# Patient Record
Sex: MALE | Race: OTHER | HISPANIC OR LATINO | URBAN - METROPOLITAN AREA
[De-identification: names, ages, dates, MRNs, and addresses within clinical notes are randomized per-mention and may not be internally consistent; named-entity substitution may affect disease eponyms.]

---

## 2019-08-18 ENCOUNTER — EMERGENCY (EMERGENCY)
Facility: HOSPITAL | Age: 44
LOS: 1 days | Discharge: HOME | End: 2019-08-18
Attending: EMERGENCY MEDICINE | Admitting: EMERGENCY MEDICINE
Payer: COMMERCIAL

## 2019-08-18 VITALS
DIASTOLIC BLOOD PRESSURE: 99 MMHG | OXYGEN SATURATION: 100 % | RESPIRATION RATE: 16 BRPM | HEART RATE: 71 BPM | SYSTOLIC BLOOD PRESSURE: 161 MMHG | TEMPERATURE: 98 F

## 2019-08-18 VITALS
RESPIRATION RATE: 16 BRPM | OXYGEN SATURATION: 98 % | HEART RATE: 69 BPM | DIASTOLIC BLOOD PRESSURE: 99 MMHG | SYSTOLIC BLOOD PRESSURE: 156 MMHG

## 2019-08-18 LAB
ALBUMIN SERPL ELPH-MCNC: 4.8 G/DL — SIGNIFICANT CHANGE UP (ref 3.3–5)
ALP SERPL-CCNC: 84 U/L — SIGNIFICANT CHANGE UP (ref 40–120)
ALT FLD-CCNC: 34 U/L — SIGNIFICANT CHANGE UP (ref 4–41)
ANION GAP SERPL CALC-SCNC: 13 MMO/L — SIGNIFICANT CHANGE UP (ref 7–14)
AST SERPL-CCNC: 26 U/L — SIGNIFICANT CHANGE UP (ref 4–40)
BILIRUB SERPL-MCNC: 0.4 MG/DL — SIGNIFICANT CHANGE UP (ref 0.2–1.2)
BUN SERPL-MCNC: 18 MG/DL — SIGNIFICANT CHANGE UP (ref 7–23)
CALCIUM SERPL-MCNC: 9.3 MG/DL — SIGNIFICANT CHANGE UP (ref 8.4–10.5)
CHLORIDE SERPL-SCNC: 101 MMOL/L — SIGNIFICANT CHANGE UP (ref 98–107)
CO2 SERPL-SCNC: 24 MMOL/L — SIGNIFICANT CHANGE UP (ref 22–31)
CREAT SERPL-MCNC: 1.08 MG/DL — SIGNIFICANT CHANGE UP (ref 0.5–1.3)
GLUCOSE SERPL-MCNC: 111 MG/DL — HIGH (ref 70–99)
HCT VFR BLD CALC: 42.7 % — SIGNIFICANT CHANGE UP (ref 39–50)
HGB BLD-MCNC: 12.9 G/DL — LOW (ref 13–17)
HIV COMBO RESULT: SIGNIFICANT CHANGE UP
HIV1+2 AB SPEC QL: SIGNIFICANT CHANGE UP
MCHC RBC-ENTMCNC: 26 PG — LOW (ref 27–34)
MCHC RBC-ENTMCNC: 30.2 % — LOW (ref 32–36)
MCV RBC AUTO: 85.9 FL — SIGNIFICANT CHANGE UP (ref 80–100)
NRBC # FLD: 0 K/UL — SIGNIFICANT CHANGE UP (ref 0–0)
PLATELET # BLD AUTO: 281 K/UL — SIGNIFICANT CHANGE UP (ref 150–400)
PMV BLD: 11.8 FL — SIGNIFICANT CHANGE UP (ref 7–13)
POTASSIUM SERPL-MCNC: 4.3 MMOL/L — SIGNIFICANT CHANGE UP (ref 3.5–5.3)
POTASSIUM SERPL-SCNC: 4.3 MMOL/L — SIGNIFICANT CHANGE UP (ref 3.5–5.3)
PROT SERPL-MCNC: 7.9 G/DL — SIGNIFICANT CHANGE UP (ref 6–8.3)
RBC # BLD: 4.97 M/UL — SIGNIFICANT CHANGE UP (ref 4.2–5.8)
RBC # FLD: 16.9 % — HIGH (ref 10.3–14.5)
SODIUM SERPL-SCNC: 138 MMOL/L — SIGNIFICANT CHANGE UP (ref 135–145)
TROPONIN T, HIGH SENSITIVITY: < 6 NG/L — SIGNIFICANT CHANGE UP (ref ?–14)
WBC # BLD: 6.03 K/UL — SIGNIFICANT CHANGE UP (ref 3.8–10.5)
WBC # FLD AUTO: 6.03 K/UL — SIGNIFICANT CHANGE UP (ref 3.8–10.5)

## 2019-08-18 PROCEDURE — 93010 ELECTROCARDIOGRAM REPORT: CPT

## 2019-08-18 PROCEDURE — 71046 X-RAY EXAM CHEST 2 VIEWS: CPT | Mod: 26

## 2019-08-18 PROCEDURE — 99284 EMERGENCY DEPT VISIT MOD MDM: CPT | Mod: 25

## 2019-08-18 RX ORDER — ASPIRIN/CALCIUM CARB/MAGNESIUM 324 MG
162 TABLET ORAL ONCE
Refills: 0 | Status: COMPLETED | OUTPATIENT
Start: 2019-08-18 | End: 2019-08-18

## 2019-08-18 RX ADMIN — Medication 162 MILLIGRAM(S): at 02:36

## 2019-08-18 NOTE — ED PROVIDER NOTE - CLINICAL SUMMARY MEDICAL DECISION MAKING FREE TEXT BOX
43yo healthy m here for 2 days intermittent chest tightness worse w exertion and mild SOB. Appears well NAD. Clear lungs, no peripheral edema or signs DVT. Possible ACS given hx though minimal risk factors. Will check labs incl trop, cxr, r/a

## 2019-08-18 NOTE — ED ADULT NURSE NOTE - OBJECTIVE STATEMENT
Break coverage- Pt received to spot #3. AAOx4, c/o right sided chest pain/tightness x2 weeks that radiates to midsternal area. Also c/o dizziness acc with the chest pain. NSR on cardiac monitor. Denies SOB. MD le performed. #20g IVSL placed to right ac, labs drawn and sent. Pt medicated with aspirin as per MD order. no acute distress. Awaiting further plan of care.

## 2019-08-18 NOTE — ED PROVIDER NOTE - OBJECTIVE STATEMENT
44m no pmh here c/o chest tightness x2 days. Pt describes intermittent dizziness he describes as lightheadedness for 2 wks; denies room-spinning. For past 2 days has felt mid-sternal tightness (denies pain) w/o radiation that is worse w exertion and improved at rest. Also w associated occasional SOB. No fever or cough. No recent travel, no leg pain/swelling, no hx VTE. Lifetime non-smoker. No family hx CAD. 44m no pmh here c/o chest tightness x1 days. Pt describes intermittent dizziness he describes as lightheadedness for 2 wks; denies room-spinning. For past 2 days has felt mid-sternal tightness (denies pain) that is worse w exertion and improved at rest. Also w associated occasional SOB. No fever or cough. No recent travel, no leg pain/swelling, no hx VTE. Lifetime non-smoker. No family hx CAD.

## 2019-08-18 NOTE — ED PROVIDER NOTE - NSFOLLOWUPINSTRUCTIONS_ED_ALL_ED_FT
-- Please follow up with a cardiologist ASA.  Please call for an appointment as soon as possible.  Explain why you were in the Emergency Department and that you need a follow up visit for continued outpatient care as soon as possible.  We will give you a list of these doctors but make sure your health insurance covers the doctor before making an appointment.   -- Please follow up with your doctor(s) within the next 3 days, but seek medical care sooner if your symptoms worsen. Call for an appointment as soon as possible.  -- You were given results from any tests performed in the Emergency Department which have results available. Show these to your doctor(s). Some of the tests we sent may not have results yet so please call or have your doctor call the Emergency Department to follow up on all results.  -- If you have worsening or concerning symptoms, see your doctor right away or return to the Emergency Department immediately.  -- Please continue taking your home medications as directed. Do not take extra doses of medication to make up for missed doses. Don't use alcohol when taking any medication (especially antibiotics, Tylenol or pain medication) unless you check with a doctor/pharmacist.

## 2019-08-18 NOTE — ED PROVIDER NOTE - PROGRESS NOTE DETAILS
Zvi Dubin, MD note: we spoke to patient after labs resulted.  We recommended admission given his worrisome HPI despite reassuring EKG & labs.  We discussed the R/B/B/A to admission in laymen's terms & the pt understood the conversation.  He elects to go home.  He was encouraged to seek rapid outpt f/u w/ cardiology. Zvi Dubin, MD note: Pt seen & reassessed.  Pt symptomatically improved.   A&Ox3, NAD, VSS, pt tolerated PO & ambulated w/steady unassisted gait in the ED.  We discussed the results of ED w/u w/patient (incl. presumptive Emergency Department dx, associated anticipatory guidance, stressing importance of prompt f/u, return precautions), & gave them a copy of results.  Patient verbalized understanding of ED course & agreed with our f/u recommendations, has decisional making capacity.  Pt st they will f/u w/PMD within the next 3 days; pt agrees to call today or tomorrow for an appointment. Pt agrees to return to the ED if there is any worsening or concerning symptoms. Zvi Dubin, MD note: we spoke to patient after labs resulted.  We recommended admission given his worrisome HPI despite reassuring EKG & labs.    The pt is clinically sober, A&Ox3, free from distracting injury.  Throughout our interactions in the Emergency Department today, the pt has demonstrated concrete thinking/reasoning, has maintained an orderly & reasonable conversation, appears to have intact insight/judgment/reason, a sound mind & therefore in our opinion has capacity now to make decisions.  Given the pt’s presentation, we explained, in laymen's terms, our concern for ACS & our recommendation for admission. The pt verbalized an understanding of our worries.  We’ve communicated to the pt that the ED evaluation is incomplete & many troublesome conditions haven’t been r/o.  We have discussed the need for further ED w/u so we can get more information about the pt’s condition.  We have reviewed the range of possible dx, potential R/B/A for testing & tx options.  Our discussions included the potential outcomes of leaving AMA, including worsening of their condition, becoming permanently disabled/in pain/critically ill, & death.  Despite these efforts, we were unable to persuade the pt to stay.  The pt is refusing any further ED care & is leaving AMA. We have attempted to offer tx/rx/guidance for any dangerous conditions which are most likely and/or dangerous.  We have answered all questions & have implored the pt to return to the ED ASAP to complete the w/u.   He was encouraged to seek rapid outpt f/u w/ cardiology.

## 2019-08-18 NOTE — ED PROVIDER NOTE - ATTENDING CONTRIBUTION TO CARE
44M no hx now p/w atraumatic, nonreproducible intermittent R pectoral tightness rad to midsternal CP today.  Sx worse w/exertion.  Also st feels light headed but not vertigo-like or presyncopal.  No n/v, diaphoresis, SOB, presyncope, LOC.    VS: afebrile, others   Gen: Well appearing in NAD  Head: NC/AT  HEENT:   Neck: trachea midline  Resp:  No distress  Ext: no deformities  Neuro:  A&O  Skin:  Warm and dry as visualized  Psych:  Normal affect and mood    EKG: NSR @69. No ischemic S-T/Tw changes.     MDM: CP most c/w ACS  based on hx, CAD risk factors, ED EKG reassuring  The CXR assists in r/o PNA, Ptx & esophageal tears.  The pt doesn't appear to have a PE based on the Wells Score & there is no apparent DVT.  There are no signs of pericarditis, endocarditis, or myocarditis based on hx, risk factor analysis & the ED EKG.  There is no fever.  There also doesn't appear to be an aortic dissection based on hx, exam, and signs.  HEART score 3    Plan: 1) serial EKGs/CXR/ASA/cardiac monitor/labs.  2) reassess.  3) SDM admit vs close outpt f/u 44M no hx now p/w atraumatic, nonreproducible intermittent R pectoral tightness rad to midsternal CP today.  Sx worse w/exertion.  Also st feels light headed but not vertigo-like or presyncopal.  Some SOB as well.  No n/v, diaphoresis, presyncope, LOC.    VS: afebrile, others   Gen: Well appearing in NAD  Head: NC/AT  HEENT:   Neck: trachea midline  Resp:  No distress  Ext: no deformities  Neuro:  A&O  Skin:  Warm and dry as visualized  Psych:  Normal affect and mood    EKG: NSR @69. No ischemic S-T/Tw changes.     MDM: CP most c/w ACS  based on hx, CAD risk factors, ED EKG reassuring  The CXR assists in r/o PNA, Ptx & esophageal tears.  The pt doesn't appear to have a PE based on the Wells Score & there is no apparent DVT.  There are no signs of pericarditis, endocarditis, or myocarditis based on hx, risk factor analysis & the ED EKG.  There is no fever.  There also doesn't appear to be an aortic dissection based on hx, exam, and signs.  HEART score 3    Plan: 1) serial EKGs/CXR/ASA/cardiac monitor/labs.  2) reassess.  3) SDM admit vs close outpt f/u

## 2020-06-01 PROBLEM — Z00.00 ENCOUNTER FOR PREVENTIVE HEALTH EXAMINATION: Status: ACTIVE | Noted: 2020-06-01

## 2020-06-09 ENCOUNTER — APPOINTMENT (OUTPATIENT)
Dept: PULMONOLOGY | Facility: CLINIC | Age: 45
End: 2020-06-09
Payer: COMMERCIAL

## 2020-06-09 DIAGNOSIS — Z82.49 FAMILY HISTORY OF ISCHEMIC HEART DISEASE AND OTHER DISEASES OF THE CIRCULATORY SYSTEM: ICD-10-CM

## 2020-06-09 DIAGNOSIS — Z72.821 INADEQUATE SLEEP HYGIENE: ICD-10-CM

## 2020-06-09 DIAGNOSIS — I10 ESSENTIAL (PRIMARY) HYPERTENSION: ICD-10-CM

## 2020-06-09 PROCEDURE — 99203 OFFICE O/P NEW LOW 30 MIN: CPT | Mod: 95

## 2020-06-09 RX ORDER — AMLODIPINE BESYLATE 5 MG/1
5 TABLET ORAL
Refills: 0 | Status: ACTIVE | COMMUNITY

## 2020-06-10 PROBLEM — Z72.821 POOR SLEEP HYGIENE: Status: ACTIVE | Noted: 2020-06-10

## 2020-06-13 NOTE — REASON FOR VISIT
[Initial] : an initial visit [Sleep Evaluation] : sleep evaluation [Home] : at home, [unfilled] , at the time of the visit. [Medical Office: ([facility])___] : at the medical office located in  [Verbal consent obtained from patient] : the patient, [unfilled]

## 2020-06-13 NOTE — ASSESSMENT
[FreeTextEntry1] : Insomnia appears to be related to some element of anxiety about sleep and poor sleep hygiene. Advised keeping same bedtime and wake up 7 days per week as close as is feasible. Avoidance of caffeine. Advised to read before bed and not use cell phone or check emails close to bedtime followup 2 weeks

## 2020-06-13 NOTE — HISTORY OF PRESENT ILLNESS
[TextBox_4] : Patient requesting evaluation for insomnia. Chronic problem with insomnia recently worse. Reports difficulty initiating sleep. Once he is asleep he maintains sleep. He has been using OTC sleeping aids for several months.\par He feels very anxious about going to sleep as he needs to get up early for work\par On weekends he goes to bed later and reports a shorter sleep latency\par He does not report much tiredness during the daytime. There is no history of snoring or witnessed apneas. He was actually treated for sleep apnea without benefit.\par \par Denies depressive symptoms

## 2020-07-29 ENCOUNTER — APPOINTMENT (OUTPATIENT)
Dept: PSYCHIATRY | Facility: CLINIC | Age: 45
End: 2020-07-29
Payer: COMMERCIAL

## 2020-07-29 PROCEDURE — 99205 OFFICE O/P NEW HI 60 MIN: CPT

## 2020-08-23 ENCOUNTER — EMERGENCY (EMERGENCY)
Facility: HOSPITAL | Age: 45
LOS: 1 days | Discharge: HOME | End: 2020-08-23
Attending: EMERGENCY MEDICINE | Admitting: EMERGENCY MEDICINE
Payer: COMMERCIAL

## 2020-08-23 VITALS
RESPIRATION RATE: 16 BRPM | DIASTOLIC BLOOD PRESSURE: 88 MMHG | HEART RATE: 97 BPM | SYSTOLIC BLOOD PRESSURE: 138 MMHG | OXYGEN SATURATION: 97 % | TEMPERATURE: 98 F

## 2020-08-23 VITALS
HEART RATE: 99 BPM | SYSTOLIC BLOOD PRESSURE: 125 MMHG | OXYGEN SATURATION: 98 % | RESPIRATION RATE: 20 BRPM | DIASTOLIC BLOOD PRESSURE: 87 MMHG | TEMPERATURE: 98 F

## 2020-08-23 PROCEDURE — 99053 MED SERV 10PM-8AM 24 HR FAC: CPT

## 2020-08-23 PROCEDURE — 99282 EMERGENCY DEPT VISIT SF MDM: CPT

## 2020-08-23 NOTE — ED PROVIDER NOTE - NS ED ROS FT
Gen: No fever, No chills  Eyes: No vision changes  Resp: No SOB  Cardiovascular: No chest pain  GI: No abdominal pain, nausea/vomiting  MS: No joint or muscle pain  Skin: No rashes  Neuro: No headache; No numbness or weakness  Remainder negative, except as per the HPI

## 2020-08-23 NOTE — ED ADULT NURSE NOTE - BP NONINVASIVE DIASTOLIC (MM HG)
This note is for the purpose of making the H & P performed in the clinic within the last 30 days available in the hospital surgical encounter.   88

## 2020-08-23 NOTE — ED PROVIDER NOTE - PHYSICAL EXAMINATION
Gen: AAOx3, non-toxic  Head: NCAT  HEENT: EOMI with pan-directional nystagmus, oral mucosa moist, normal conjunctiva  Lung: CTAB, no respiratory distress, no wheezes/rhonchi/rales B/L, speaking in full sentences  CV: RRR, no murmurs, rubs or gallops  Abd: soft, NTND, no guarding  MSK: no visible deformities  Neuro: No focal sensory or motor deficits, steady gait  Skin: Warm, well perfused, no rash  Psych: normal affect.   ~Jeremias Hightower PGY3
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
## 2020-08-23 NOTE — ED PROVIDER NOTE - NSFOLLOWUPINSTRUCTIONS_ED_ALL_ED_FT
Follow up with your primary doctor if you develop any new symptoms.    Seek immediate medical care if you have new or worsening symptoms.

## 2020-08-28 ENCOUNTER — APPOINTMENT (OUTPATIENT)
Dept: PSYCHIATRY | Facility: CLINIC | Age: 45
End: 2020-08-28
Payer: COMMERCIAL

## 2020-08-28 DIAGNOSIS — F32.9 MAJOR DEPRESSIVE DISORDER, SINGLE EPISODE, UNSPECIFIED: ICD-10-CM

## 2020-08-28 PROCEDURE — 99214 OFFICE O/P EST MOD 30 MIN: CPT

## 2020-08-28 PROCEDURE — 90791 PSYCH DIAGNOSTIC EVALUATION: CPT

## 2020-09-15 ENCOUNTER — APPOINTMENT (OUTPATIENT)
Dept: PSYCHIATRY | Facility: CLINIC | Age: 45
End: 2020-09-15
Payer: COMMERCIAL

## 2020-09-15 PROCEDURE — 90834 PSYTX W PT 45 MINUTES: CPT

## 2020-09-25 ENCOUNTER — APPOINTMENT (OUTPATIENT)
Dept: PSYCHIATRY | Facility: CLINIC | Age: 45
End: 2020-09-25
Payer: COMMERCIAL

## 2020-09-25 PROCEDURE — 99214 OFFICE O/P EST MOD 30 MIN: CPT

## 2020-09-29 ENCOUNTER — APPOINTMENT (OUTPATIENT)
Dept: PSYCHIATRY | Facility: CLINIC | Age: 45
End: 2020-09-29
Payer: COMMERCIAL

## 2020-09-29 PROCEDURE — 90837 PSYTX W PT 60 MINUTES: CPT

## 2020-10-23 ENCOUNTER — APPOINTMENT (OUTPATIENT)
Dept: PSYCHIATRY | Facility: CLINIC | Age: 45
End: 2020-10-23
Payer: COMMERCIAL

## 2020-10-23 PROCEDURE — 99214 OFFICE O/P EST MOD 30 MIN: CPT

## 2020-10-23 PROCEDURE — 99072 ADDL SUPL MATRL&STAF TM PHE: CPT

## 2020-10-27 ENCOUNTER — APPOINTMENT (OUTPATIENT)
Dept: PSYCHIATRY | Facility: CLINIC | Age: 45
End: 2020-10-27
Payer: COMMERCIAL

## 2020-10-27 PROCEDURE — 90837 PSYTX W PT 60 MINUTES: CPT

## 2020-10-27 PROCEDURE — 99072 ADDL SUPL MATRL&STAF TM PHE: CPT

## 2020-11-24 ENCOUNTER — APPOINTMENT (OUTPATIENT)
Dept: PSYCHIATRY | Facility: CLINIC | Age: 45
End: 2020-11-24
Payer: COMMERCIAL

## 2020-11-24 PROCEDURE — 90834 PSYTX W PT 45 MINUTES: CPT

## 2020-12-08 ENCOUNTER — APPOINTMENT (OUTPATIENT)
Dept: PSYCHIATRY | Facility: CLINIC | Age: 45
End: 2020-12-08

## 2021-01-12 ENCOUNTER — APPOINTMENT (OUTPATIENT)
Dept: PSYCHIATRY | Facility: CLINIC | Age: 46
End: 2021-01-12
Payer: COMMERCIAL

## 2021-01-12 DIAGNOSIS — G47.00 INSOMNIA, UNSPECIFIED: ICD-10-CM

## 2021-01-12 DIAGNOSIS — F32.9 MAJOR DEPRESSIVE DISORDER, SINGLE EPISODE, UNSPECIFIED: ICD-10-CM

## 2021-01-12 DIAGNOSIS — F41.9 ANXIETY DISORDER, UNSPECIFIED: ICD-10-CM

## 2021-01-12 PROCEDURE — 90834 PSYTX W PT 45 MINUTES: CPT

## 2021-01-12 PROCEDURE — 99072 ADDL SUPL MATRL&STAF TM PHE: CPT

## 2021-01-29 ENCOUNTER — APPOINTMENT (OUTPATIENT)
Dept: PSYCHIATRY | Facility: CLINIC | Age: 46
End: 2021-01-29
Payer: COMMERCIAL

## 2021-01-29 PROCEDURE — 99072 ADDL SUPL MATRL&STAF TM PHE: CPT

## 2021-01-29 PROCEDURE — 99214 OFFICE O/P EST MOD 30 MIN: CPT

## 2021-04-23 ENCOUNTER — APPOINTMENT (OUTPATIENT)
Dept: PSYCHIATRY | Facility: CLINIC | Age: 46
End: 2021-04-23
Payer: COMMERCIAL

## 2021-04-23 PROCEDURE — 99214 OFFICE O/P EST MOD 30 MIN: CPT

## 2021-04-23 PROCEDURE — 99072 ADDL SUPL MATRL&STAF TM PHE: CPT

## 2021-05-02 NOTE — ED ADULT NURSE NOTE - NSSUHOSCREENINGYN_ED_ALL_ED
You can access the FollowMyHealth Patient Portal offered by [facility] by registering at the following website: [email or web address]. By joining [facility]’s FollowMyHealth portal, you will also be able to view your health information using other applications (apps) compatible with our system.
Yes - the patient is able to be screened

## 2021-05-07 ENCOUNTER — RX RENEWAL (OUTPATIENT)
Age: 46
End: 2021-05-07

## 2021-07-16 ENCOUNTER — APPOINTMENT (OUTPATIENT)
Dept: PSYCHIATRY | Facility: CLINIC | Age: 46
End: 2021-07-16
Payer: COMMERCIAL

## 2021-07-16 PROCEDURE — 99072 ADDL SUPL MATRL&STAF TM PHE: CPT

## 2021-07-16 PROCEDURE — 99214 OFFICE O/P EST MOD 30 MIN: CPT

## 2021-10-15 ENCOUNTER — APPOINTMENT (OUTPATIENT)
Dept: PSYCHIATRY | Facility: CLINIC | Age: 46
End: 2021-10-15
Payer: COMMERCIAL

## 2021-10-15 PROCEDURE — 99214 OFFICE O/P EST MOD 30 MIN: CPT

## 2022-01-14 ENCOUNTER — APPOINTMENT (OUTPATIENT)
Dept: PSYCHIATRY | Facility: CLINIC | Age: 47
End: 2022-01-14

## 2022-01-25 ENCOUNTER — APPOINTMENT (OUTPATIENT)
Dept: PSYCHIATRY | Facility: CLINIC | Age: 47
End: 2022-01-25
Payer: COMMERCIAL

## 2022-01-25 PROCEDURE — 99214 OFFICE O/P EST MOD 30 MIN: CPT

## 2022-07-19 ENCOUNTER — APPOINTMENT (OUTPATIENT)
Dept: PSYCHIATRY | Facility: CLINIC | Age: 47
End: 2022-07-19

## 2022-07-19 PROCEDURE — 99214 OFFICE O/P EST MOD 30 MIN: CPT

## 2022-10-18 ENCOUNTER — APPOINTMENT (OUTPATIENT)
Dept: PSYCHIATRY | Facility: CLINIC | Age: 47
End: 2022-10-18

## 2022-10-18 PROCEDURE — 99214 OFFICE O/P EST MOD 30 MIN: CPT

## 2022-10-18 NOTE — PAST MEDICAL HISTORY
[FreeTextEntry1] : H/o depression and anxiety\par \par No previous psychiatric hospitalizations or psych ER visits or previous suicidal thoughts, behaviors, or attempts prior to age.\par  \par Therapy: At 21 suffered from depression because of a breakup with a malinda. Saw a psychiatrist at that time and he put patient on Prozac. In his 20’s patient was questioning his sexuality. \par \par Medication trials. Prozac (stopped it doesn’t remember it). \par \par Fire arm Access: none\par \par
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
## 2022-10-18 NOTE — PHYSICAL EXAM
[None] : none [Anxious] : anxious [Dysphoric] : dysphoric [Oriented] : oriented [Normal] : good [Fair] : fair [Lethargic] : no lethargic [Unarousable] : not unarousable [Impaired Naming] : no impaired naming [Impaired Repeating] : no impaired repeating [FreeTextEntry8] : "I'm depressed." better [FreeTextEntry9] : better Yes
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
## 2022-10-18 NOTE — CURRENT PSYCHIATRIC SYMPTOMS
[Depressed Mood] : depressed mood [Anhedonia] : anhedonia [Decreased Concentration] : decreased concentrating ability [Insomnia] : insomnia [Excessive Worry] : excessive worry [Ruminations] : ruminations [Obsessions] : obsessions [Hypochondriasis] : hypochondriasis [Panic] : panic  [Guilt] : not feeling guilty [de-identified] : better [de-identified] : denies [de-identified] : denies [de-identified] : better [de-identified] : denies [de-identified] : denies [de-identified] : denies

## 2022-10-18 NOTE — REASON FOR VISIT
[Evaluation] : evaluation of [FreeTextEntry1] : depression and anxiety [FreeTextEntry2] : Psychiatrist/self

## 2022-10-18 NOTE — DISCUSSION/SUMMARY
[FreeTextEntry1] : Patient is a 45  male with h/o anxiety, and depression seen today for medication management. Patient is compliant with his medication tolerating them well, without any side effects. I-stop reviewed and no issues noticed.\par \par PLAN:\par Continue Lexapro 10 mg PO QAM for depression and anxiety. Did not want to be put on Welbutrin and did not want to increase the Lexapro due to sexual side effects. \par - Discussed risks and benefits of medications including side effects of GI and sexual side effects with SSRI. Alternative strategies including no intervention discussed with patient. Patient consents to current medications as prescribed.\par - Patient understands to contact clinic prn with concerns and agrees to call 911 or go to nearest ER if symptoms worsen.\par - Next appointment made in 6 month. Patient left the office without any distress.\par  \par

## 2023-04-05 ENCOUNTER — EMERGENCY (EMERGENCY)
Facility: HOSPITAL | Age: 48
LOS: 1 days | Discharge: HOME | End: 2023-04-05
Admitting: STUDENT IN AN ORGANIZED HEALTH CARE EDUCATION/TRAINING PROGRAM
Payer: COMMERCIAL

## 2023-04-05 VITALS
HEART RATE: 96 BPM | SYSTOLIC BLOOD PRESSURE: 151 MMHG | TEMPERATURE: 98 F | RESPIRATION RATE: 16 BRPM | DIASTOLIC BLOOD PRESSURE: 103 MMHG | OXYGEN SATURATION: 98 %

## 2023-04-05 LAB
ALBUMIN SERPL ELPH-MCNC: 4.5 G/DL — SIGNIFICANT CHANGE UP (ref 3.3–5)
ALP SERPL-CCNC: 78 U/L — SIGNIFICANT CHANGE UP (ref 40–120)
ALT FLD-CCNC: 34 U/L — SIGNIFICANT CHANGE UP (ref 4–41)
ANION GAP SERPL CALC-SCNC: 13 MMOL/L — SIGNIFICANT CHANGE UP (ref 7–14)
APPEARANCE UR: CLEAR — SIGNIFICANT CHANGE UP
AST SERPL-CCNC: 29 U/L — SIGNIFICANT CHANGE UP (ref 4–40)
BACTERIA # UR AUTO: NEGATIVE — SIGNIFICANT CHANGE UP
BASOPHILS # BLD AUTO: 0.05 K/UL — SIGNIFICANT CHANGE UP (ref 0–0.2)
BASOPHILS NFR BLD AUTO: 0.9 % — SIGNIFICANT CHANGE UP (ref 0–2)
BILIRUB SERPL-MCNC: 0.2 MG/DL — SIGNIFICANT CHANGE UP (ref 0.2–1.2)
BILIRUB UR-MCNC: NEGATIVE — SIGNIFICANT CHANGE UP
BUN SERPL-MCNC: 20 MG/DL — SIGNIFICANT CHANGE UP (ref 7–23)
CALCIUM SERPL-MCNC: 9.8 MG/DL — SIGNIFICANT CHANGE UP (ref 8.4–10.5)
CHLORIDE SERPL-SCNC: 105 MMOL/L — SIGNIFICANT CHANGE UP (ref 98–107)
CO2 SERPL-SCNC: 22 MMOL/L — SIGNIFICANT CHANGE UP (ref 22–31)
COLOR SPEC: SIGNIFICANT CHANGE UP
CREAT SERPL-MCNC: 0.97 MG/DL — SIGNIFICANT CHANGE UP (ref 0.5–1.3)
DIFF PNL FLD: NEGATIVE — SIGNIFICANT CHANGE UP
EGFR: 96 ML/MIN/1.73M2 — SIGNIFICANT CHANGE UP
EOSINOPHIL # BLD AUTO: 0.39 K/UL — SIGNIFICANT CHANGE UP (ref 0–0.5)
EOSINOPHIL NFR BLD AUTO: 6.9 % — HIGH (ref 0–6)
GLUCOSE SERPL-MCNC: 107 MG/DL — HIGH (ref 70–99)
GLUCOSE UR QL: NEGATIVE — SIGNIFICANT CHANGE UP
HCT VFR BLD CALC: 36.2 % — LOW (ref 39–50)
HGB BLD-MCNC: 11.3 G/DL — LOW (ref 13–17)
IANC: 2.66 K/UL — SIGNIFICANT CHANGE UP (ref 1.8–7.4)
IMM GRANULOCYTES NFR BLD AUTO: 0.4 % — SIGNIFICANT CHANGE UP (ref 0–0.9)
KETONES UR-MCNC: NEGATIVE — SIGNIFICANT CHANGE UP
LEUKOCYTE ESTERASE UR-ACNC: ABNORMAL
LYMPHOCYTES # BLD AUTO: 1.94 K/UL — SIGNIFICANT CHANGE UP (ref 1–3.3)
LYMPHOCYTES # BLD AUTO: 34.2 % — SIGNIFICANT CHANGE UP (ref 13–44)
MCHC RBC-ENTMCNC: 26.3 PG — LOW (ref 27–34)
MCHC RBC-ENTMCNC: 31.2 GM/DL — LOW (ref 32–36)
MCV RBC AUTO: 84.2 FL — SIGNIFICANT CHANGE UP (ref 80–100)
MONOCYTES # BLD AUTO: 0.61 K/UL — SIGNIFICANT CHANGE UP (ref 0–0.9)
MONOCYTES NFR BLD AUTO: 10.8 % — SIGNIFICANT CHANGE UP (ref 2–14)
NEUTROPHILS # BLD AUTO: 2.66 K/UL — SIGNIFICANT CHANGE UP (ref 1.8–7.4)
NEUTROPHILS NFR BLD AUTO: 46.8 % — SIGNIFICANT CHANGE UP (ref 43–77)
NITRITE UR-MCNC: NEGATIVE — SIGNIFICANT CHANGE UP
NRBC # BLD: 0 /100 WBCS — SIGNIFICANT CHANGE UP (ref 0–0)
NRBC # FLD: 0 K/UL — SIGNIFICANT CHANGE UP (ref 0–0)
PH UR: 6 — SIGNIFICANT CHANGE UP (ref 5–8)
PLATELET # BLD AUTO: 322 K/UL — SIGNIFICANT CHANGE UP (ref 150–400)
POTASSIUM SERPL-MCNC: 4.1 MMOL/L — SIGNIFICANT CHANGE UP (ref 3.5–5.3)
POTASSIUM SERPL-SCNC: 4.1 MMOL/L — SIGNIFICANT CHANGE UP (ref 3.5–5.3)
PROT SERPL-MCNC: 6.9 G/DL — SIGNIFICANT CHANGE UP (ref 6–8.3)
PROT UR-MCNC: ABNORMAL
RBC # BLD: 4.3 M/UL — SIGNIFICANT CHANGE UP (ref 4.2–5.8)
RBC # FLD: 14.5 % — SIGNIFICANT CHANGE UP (ref 10.3–14.5)
RBC CASTS # UR COMP ASSIST: 1 /HPF — SIGNIFICANT CHANGE UP (ref 0–4)
SODIUM SERPL-SCNC: 140 MMOL/L — SIGNIFICANT CHANGE UP (ref 135–145)
SP GR SPEC: 1.03 — SIGNIFICANT CHANGE UP (ref 1.01–1.05)
UROBILINOGEN FLD QL: SIGNIFICANT CHANGE UP
WBC # BLD: 5.67 K/UL — SIGNIFICANT CHANGE UP (ref 3.8–10.5)
WBC # FLD AUTO: 5.67 K/UL — SIGNIFICANT CHANGE UP (ref 3.8–10.5)
WBC UR QL: 15 /HPF — HIGH (ref 0–5)

## 2023-04-05 PROCEDURE — 99284 EMERGENCY DEPT VISIT MOD MDM: CPT

## 2023-04-05 PROCEDURE — 76770 US EXAM ABDO BACK WALL COMP: CPT | Mod: 26

## 2023-04-05 RX ORDER — KETOROLAC TROMETHAMINE 30 MG/ML
15 SYRINGE (ML) INJECTION ONCE
Refills: 0 | Status: DISCONTINUED | OUTPATIENT
Start: 2023-04-05 | End: 2023-04-05

## 2023-04-05 RX ADMIN — Medication 15 MILLIGRAM(S): at 22:43

## 2023-04-05 NOTE — ED ADULT TRIAGE NOTE - CHIEF COMPLAINT QUOTE
alert oriented c/o freeman lower back pain and freeman flank pain started 3 days ago and getting worse no N/V/D PMHx HTN  anxiety

## 2023-04-05 NOTE — ED ADULT NURSE NOTE - OBJECTIVE STATEMENT
Pt A&Ox4 ambulatory, PMH HTN, Anxiety, presenting to the ED (RM__) c/o b/l lower back and flank pain. Pt states developed symptoms over the weekend. Pt states "When I pee, it feels very warm". Denies any other symptoms. Respirations are even and unlabored, NAD, 20G IV RAC, labs sent, medicated as per orders. Safety precautions implemented as per protocol, awaiting further MD orders, will continue to with plan of care.

## 2023-04-06 VITALS
OXYGEN SATURATION: 100 % | SYSTOLIC BLOOD PRESSURE: 138 MMHG | TEMPERATURE: 98 F | DIASTOLIC BLOOD PRESSURE: 88 MMHG | RESPIRATION RATE: 16 BRPM | HEART RATE: 70 BPM

## 2023-04-06 RX ORDER — CEPHALEXIN 500 MG
1 CAPSULE ORAL
Qty: 15 | Refills: 0
Start: 2023-04-06 | End: 2023-04-10

## 2023-04-06 NOTE — ED PROVIDER NOTE - OBJECTIVE STATEMENT
This is a 47-year-old male with past medical history of hypertension comes in complaining having lower back pain on the left side he says that the pain has been there for the past few days and he said that the pain occasionally goes down his leg and goes into the abdomen.  He denies having any nausea vomiting no diarrhea or constipation states that he is urinating fine no burning or discomfort or penile discharge.  He is sexually active 1 partner no concerns for any STDs.  He denies having any urinary urgency or frequency denies having any chest pain shortness of breath exertional dyspnea.

## 2023-04-06 NOTE — ED PROVIDER NOTE - CONDITION AT DISCHARGE:
Pt stated she is taking BID and does experience symptoms after each dose. She is agreeable to trying a new dose.     Writer spoke with PCP in-person and she said pt can choose whether we should contact EP or send in a new dose of metoprolol 12.5mg BID    Writer spoke to patient and patient stated she will try the new dose of metoprolol and to please send to [facility] pharmacy as she is here for a different appt anyway. Patient stated she will check at pharmacy following her visit with RN today.    Improved

## 2023-04-06 NOTE — ED PROVIDER NOTE - NSFOLLOWUPINSTRUCTIONS_ED_ALL_ED_FT
Rest, drink plenty of fluids.  Advance activity as tolerated.  Continue all previously prescribed medications as directed.  Follow up with your primary care physician in 48-72 hours- bring copies of your results.  Return to the ER for worsening or persistent symptoms, and/or ANY NEW OR CONCERNING SYMPTOMS. If you have issues obtaining follow up, please call: [phone] to obtain a doctor or specialist who takes your insurance in your area.  You may call [phone] to make an appointment with the internal medicine clinic.

## 2023-04-06 NOTE — ED PROVIDER NOTE - PATIENT PORTAL LINK FT
You can access the FollowMyHealth Patient Portal offered by [facility] by registering at the following website: [email or web address]. By joining [facility]’s FollowMyHealth portal, you will also be able to view your health information using other applications (apps) compatible with our system.

## 2023-04-06 NOTE — ED PROVIDER NOTE - CLINICAL SUMMARY MEDICAL DECISION MAKING FREE TEXT BOX
This is a 47-year-old male with past medical history of hypertension comes in complaining having lower back pain on the left side he says that the pain has been there for the past few days and he said that the pain occasionally goes down his leg and goes into the abdomen. Back pain will r/o kidney stone/uti- will do labs, ultrasound, ua and reassess

## 2023-04-21 ENCOUNTER — APPOINTMENT (OUTPATIENT)
Dept: PSYCHIATRY | Facility: CLINIC | Age: 48
End: 2023-04-21
Payer: COMMERCIAL

## 2023-04-21 PROCEDURE — 99214 OFFICE O/P EST MOD 30 MIN: CPT
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
## 2023-04-21 NOTE — HISTORY OF PRESENT ILLNESS
[de-identified] : Patient is here in the office for face to face interview with writer for 6 month follow-up visit.\par \par No medication changes were done 6 months ago. Mood is stable. Denies any depression or anxiety. Sleep is good. Getting 7 hrs per night.  Wears a mouth guard as he had bruxism. Continues to have vivid dreams. Denies any problems with appetite, energy or concentration and motivation. Last alcohol was Wednesday. Excited that he is turning the big 5 0 in 2 weeks.   [de-identified] : Patient is here in the office for face to face interview for initial psychiatric evaluation. \par \par ID: Patient is a 44 yo  homosexual male, single, employed as a  seen today for psychiatric evaluation and medication management. \par \par HPI: Patient states he has been suffering from depression and anxiety since 2017 but has increased since 2020 during when the pandemic started. 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 Patient froze at one point because it was very overwhelming and it brought back the time when he was taking care of his older sister who passed away from cancer in 2017. “I never got any closure after she passed away. My sister was my everything because she was the one taking care of all of us. I never went to get any therapy or counseling when she  because in my mind I am scared to relieve because I haven’t accepted it yet that she did die. In my world she is still alive. I still can’t believe she is gone.”  Patient took time off from work from -Aug 10, 2020. \par \par Depression: reports low mood everyday and anhedonia\par \par Anxiety: Patient endorses to anxiety in the form of worrying, rumination, panic like symptoms like palpitations, sweating, chest tightness. Last attack was last week 2200. Patient states he noticed that he has been grinding his teeth for last 8 mths and has increased for past 2 mths. Sometimes botes tongue as well. Somatic like symptoms like headhaces. OCD like being very organized. Have to put all the condiment package in their own drawer (ketchup, mustard, hot sauce). PTSD.\par \par PTSD: starting  patient has been having nightmares of him faling, chest tightness 1-2 per month. Denies any flashback. Denies ever falling. \par \par Stressors contributing to his depression and anxiety: COVID, sister passin away in 2017 at the age of 52. Patient’s mother had a stroke 2020. \par \par Sleep: decreased sleeping 5-6 hrs full. Takes Zquill. Goes to sleep from 9 pm to 6 am. Fels groggy and not rested next day AM. Problem is going to sleep. \par \par Appetite is good\par \par Energy, concentration and motivation are all decreased. Patient denies any AVH, SI or HI. \par \par Hypomanic symptoms irritability, decrease need for sleep, FOI and increase energy.\par \par Substance Use Hx: \par Alcohol: started drinking at 21. Highest amt 1.5 bottle of wine and 2 beers. Last drink was yesterday 2020. Patient endorses to HUSAM, but denies any DWI, or withdrawal symptoms. States the problem is when I start drinking I am not able to control or stop drinking. I don’t drink alone. I drink when I am with my friends or having a zoom party for happy hour. \par

## 2023-04-21 NOTE — PHYSICAL EXAM
[None] : none [Anxious] : anxious [Dysphoric] : dysphoric [Oriented] : oriented [Lethargic] : no lethargic [Unarousable] : not unarousable [Impaired Naming] : no impaired naming [Impaired Repeating] : no impaired repeating [Normal] : good [Fair] : fair [FreeTextEntry8] : "I'm depressed." better [FreeTextEntry9] : better

## 2023-10-20 ENCOUNTER — APPOINTMENT (OUTPATIENT)
Dept: PSYCHIATRY | Facility: CLINIC | Age: 48
End: 2023-10-20
Payer: COMMERCIAL

## 2023-10-20 PROCEDURE — 99214 OFFICE O/P EST MOD 30 MIN: CPT

## 2023-10-23 NOTE — ED ADULT NURSE NOTE - NS ED PATIENT SAFETY CONCERN
Lab orders placed.   
Patient is scheduled for labs on 11/6/23. Please place desired orders or advise patient to cancel appointment if labs are no longer needed. Thank you.     
No

## 2024-05-08 ENCOUNTER — APPOINTMENT (OUTPATIENT)
Dept: PSYCHIATRY | Facility: CLINIC | Age: 49
End: 2024-05-08
Payer: COMMERCIAL

## 2024-05-08 PROCEDURE — 99214 OFFICE O/P EST MOD 30 MIN: CPT

## 2024-05-08 NOTE — PAST MEDICAL HISTORY
[FreeTextEntry1] : H/o depression and anxiety\par  \par  No previous psychiatric hospitalizations or psych ER visits or previous suicidal thoughts, behaviors, or attempts prior to age.\par   \par  Therapy: At 21 suffered from depression because of a breakup with a malinda. Saw a psychiatrist at that time and he put patient on Prozac. In his 20's patient was questioning his sexuality. \par  \par  Medication trials. Prozac (stopped it doesn't remember it). \par  \par  Fire arm Access: none\par  \par
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
## 2024-05-08 NOTE — HISTORY OF PRESENT ILLNESS
[de-identified] : Patient is here in the office for face to face interview with writer for 6 month follow-up visit.  No medication changes were done 6 months ago. States he has been feeling anxious and wanted to ask about increasing the dose of the Lexapro.  Mood is stable. Denies any depression. Anxiety is there. Sleep is good. Getting 7 hrs per night.  Wears a mouth guard as he had bruxism. Continues to have vivid dreams. Denies any problems with appetite, energy or concentration and motivation. Last alcohol was Sunday.

## 2024-05-08 NOTE — DISCUSSION/SUMMARY
[FreeTextEntry1] : Patient is a 45  male with h/o anxiety, and depression seen today for medication management. Patient is compliant with his medication tolerating them well, without any side effects. I-stop reviewed and no issues noticed.  PLAN: Increase Lexapro 10 to 15 mg PO QAM for depression and anxiety.  - Discussed risks and benefits of medications including side effects of GI and sexual side effects with SSRI. Alternative strategies including no intervention discussed with patient. Patient consents to current medications as prescribed. - Patient understands to contact clinic prn with concerns and agrees to call 911 or go to nearest ER if symptoms worsen. - Next appointment made in 1 month. Patient left the office without any distress.

## 2024-06-05 ENCOUNTER — APPOINTMENT (OUTPATIENT)
Dept: PSYCHIATRY | Facility: CLINIC | Age: 49
End: 2024-06-05
Payer: COMMERCIAL

## 2024-06-05 PROCEDURE — 99214 OFFICE O/P EST MOD 30 MIN: CPT

## 2024-06-05 RX ORDER — ESCITALOPRAM OXALATE 10 MG/1
10 TABLET ORAL
Qty: 90 | Refills: 1 | Status: ACTIVE | COMMUNITY
Start: 2020-07-29 | End: 1900-01-01

## 2024-06-05 RX ORDER — ESCITALOPRAM OXALATE 5 MG/1
5 TABLET ORAL DAILY
Qty: 90 | Refills: 1 | Status: ACTIVE | COMMUNITY
Start: 2024-05-08 | End: 1900-01-01

## 2024-06-05 NOTE — DISCUSSION/SUMMARY
[FreeTextEntry1] : Patient is a 45  male with h/o anxiety, and depression seen today for medication management. Patient is compliant with his medication tolerating them well, without any side effects. I-stop reviewed and no issues noticed.  PLAN: Continue Lexapro 15 mg PO QAM for depression and anxiety.  - Discussed risks and benefits of medications including side effects of GI and sexual side effects with SSRI. Alternative strategies including no intervention discussed with patient. Patient consents to current medications as prescribed. - Patient understands to contact clinic prn with concerns and agrees to call 911 or go to nearest ER if symptoms worsen. - Next appointment made in 6 month. Patient left the office without any distress.
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
## 2024-06-05 NOTE — HISTORY OF PRESENT ILLNESS
[de-identified] : Patient is here in the office for face to face interview with writer for 1 month follow-up visit.  Last month Lexapro was increased from 10 to 15 mg. States he si doing very well on it.  Mood is stable. Denies any depression. Anxiety is there but manageable. Sleep is good. Getting 7 hrs per night.  Wears a mouth guard as he had bruxism. Continues to have vivid dreams. Denies any problems with appetite, energy or concentration and motivation. Last alcohol was Sunday.

## 2024-12-04 ENCOUNTER — APPOINTMENT (OUTPATIENT)
Dept: PSYCHIATRY | Facility: CLINIC | Age: 49
End: 2024-12-04
Payer: COMMERCIAL

## 2024-12-04 PROCEDURE — 99214 OFFICE O/P EST MOD 30 MIN: CPT

## 2025-01-08 ENCOUNTER — OUTPATIENT (OUTPATIENT)
Dept: OUTPATIENT SERVICES | Facility: HOSPITAL | Age: 50
LOS: 1 days | End: 2025-01-08
Payer: COMMERCIAL

## 2025-01-08 VITALS
SYSTOLIC BLOOD PRESSURE: 142 MMHG | TEMPERATURE: 98 F | RESPIRATION RATE: 16 BRPM | DIASTOLIC BLOOD PRESSURE: 82 MMHG | HEART RATE: 72 BPM | HEIGHT: 69 IN | WEIGHT: 214.95 LBS | OXYGEN SATURATION: 98 %

## 2025-01-08 DIAGNOSIS — Z87.898 PERSONAL HISTORY OF OTHER SPECIFIED CONDITIONS: Chronic | ICD-10-CM

## 2025-01-08 DIAGNOSIS — Z01.818 ENCOUNTER FOR OTHER PREPROCEDURAL EXAMINATION: ICD-10-CM

## 2025-01-08 DIAGNOSIS — K64.3 FOURTH DEGREE HEMORRHOIDS: ICD-10-CM

## 2025-01-08 PROCEDURE — G0463: CPT

## 2025-01-08 NOTE — H&P PST ADULT - ASSESSMENT
48 y/o male with hemorrhoids  Planned surgery.- hemorrhoidectomy  Will obtain medical clearance  Pre op instructions provided  Instructions provided on medications to continue and to take the day morning of surgery

## 2025-01-08 NOTE — H&P PST ADULT - HISTORY OF PRESENT ILLNESS
50 y/o male with hemorrhoids scheduled for hemorrhoidectomy. Had it for years. Denies any acute bleeding

## 2025-01-14 ENCOUNTER — TRANSCRIPTION ENCOUNTER (OUTPATIENT)
Age: 50
End: 2025-01-14

## 2025-01-14 NOTE — ASU DISCHARGE PLAN (ADULT/PEDIATRIC) - FOR NEXT 24 HOURS DO NOT:
- Diagnosis


Diagnosis: fall, sciatica, rib fractures


Code Status: Full Code





- Medication Management


Discharge Medications: 


 Medications to Continue on Transfer





Aspirin [Aspirin 325 mg (*)] 325 mg PO DAILY@17 05/08/18 [Last Taken 05/07/18]


Atorvastatin Calcium [Lipitor 40 mg (*)] 40 mg PO DAILY@17 05/08/18 [Last Taken 

05/07/18]


Calcium/D3/Mag Ox//Abel/Zn [Caltrate+D3 Plus Mineral Minis] 1 each PO DAILY 

05/08/18 [Last Taken 05/08/18]


Carvedilol 6.25 mg PO DAILY 05/08/18 [Last Taken 05/08/18]


Carvedilol 12.5 mg PO DAILY@17 05/08/18 [Last Taken 05/07/18]


Clopidogrel Bisulfate [Clopidogrel] 75 mg PO DAILY 05/08/18 [Last Taken 05/08/18

]


Digoxin [Lanoxin 250 mcg (RX)] 250 mcg PO DAILY@17 05/08/18 [Last Taken 05/07/18

]


Fluticasone Hfa 220 Mcg [Flovent 220 MCG Hfa MDI (*)] 1 puffs IH BID 05/08/18 [

Last Taken Unknown]


Lisinopril [Zestril 5 mg (*)] 5 mg PO MOWEFR 05/08/18 [Last Taken 05/07/18]


Omeprazole 20 mg PO DAILY@17 05/08/18 [Last Taken 05/07/18]


Sennosides/Docusate Sodium [Stool Softener Tablet] 1 each PO DAILY PRN 05/08/18 

[Last Taken Unknown]


Tamsulosin HCl [Flomax 0.4 MG (*)] 0.4 mg PO DAILY@17 05/08/18 [Last Taken 05/07 /18]


levOFLOXACIN [levAQUIN (*)] 750 mg PO ONCE 05/08/18 [Last Taken 05/07/18]


Acetaminophen [Tylenol 325mg (*)] 325 - 650 mg PO Q4HRS PRN  tab 05/09/18 [Last 

Taken Unknown]


traMADol [Ultram 50 mg (*)] 25 - 50 mg PO Q6HRS PRN #30 tab 05/09/18 [Last 

Taken Unknown]








Discharge Medications: Refer to the Discharge Home Medication list for PRN 

reason.





- Orders


Services needed: Home Care, Physical Therapy, Occupational Therapy


Home Care Face to Face: I certify that this patient was under my care and that 

I had the required face-to-face encounter meeting the encounter requirements on 

the discharge day.  My findings support the fact that the patient is homebound 

as defined in


Home Care Face to Face Continued: CMS Chapter 7 Medicare Benefits Manual 30.1.1

, The condition of the patient is such that there exists a normal inability to 

leave home and consequently, leaving home would require a considerable and 

taxing effort.


Diet Recommendation: no restrictions on diet


Additional Instructions: 


Use a walker for safety.  Follow up with Dr. Adamson to discuss repeat epidural 

injection.





- Follow Up Care


Current Providers and Referrals: 


Wellington Adamson MD [Primary Care Provider] - As per Instructions Statement Selected

## 2025-01-14 NOTE — ASU DISCHARGE PLAN (ADULT/PEDIATRIC) - ASU DC SPECIAL INSTRUCTIONSFT
You just had anorectal surgery. Please follow the instructions below to make your recovery as comfortable as possible.    **REST! Apply ice packs to incision sites 20 mins on, 20 mins off every 4 hours for the first 24 hours.    If taking narcotic pain medications, may take COLACE (OTC stool softener) as directed to prevent constipation. Increase ambulation and utilize incentive spirometer as directed.**    1. Depending on the procedure, you may or may not have pain. Please fill any prescriptions you have been given and take as directed.    You may take plain Tylenol, ibuprofen (Advil, Motrin), or Aleve if you wish. Do not take Ibuprofen or Aleve if you have been given a prescription for ketorolac (Toradol). Do not take Tylenol at the same time as oxycodone/acetaminophen (Percocet) or hydrocodone/acetaminophen (Vicodin). You may alternate doses with Tylenol.    If you take aspirin, warfarin (Coumadin), Plavix or any other blood thinner, ask your surgeon when you should re-start these medications.    2. Keep your stool nice and soft to avoid trauma to healing area with hard stool:    - Metamucil or Konsyl (fiber supplement, available over the counter), 1 tablespoon in 8 oz. of water or juice twice a day  - Colace (stool softener, available over the counter), 100mg three times a day  - If you do not have a bowel movement in two days, take 2 tablespoons of liquid Milk of Magnesia (available over the counter) with each meal until you do, then stop taking the Milk of Magnesia.     3. SITZ BATHS (plain warm water) at least four times a day and after every bowel movement for 15-20min each. Avoid toilet paper after a bowel movement, use soap and water or unscented wipes.    4. You should expect some blood staining of your dressing or on the toilet paper. If bleeding is persistent, but light, apply direct and gentle pressure to the area and lie flat in bed for 10-15 min. If bleeding is persistent and heavy or is associated with clots call the office immediately.    5. If you are unable to urinate in 6-8 hours, sit in a warm water bath for 15-20 min and allow yourself to urinate in the bath. If you are still unable to urinate, call the office.    *Please call the office for an appointment when you get home ([phone]). If you have any questions, problems or concerns, do not hesitate to call the office.

## 2025-01-14 NOTE — ASU DISCHARGE PLAN (ADULT/PEDIATRIC) - CALL YOUR DOCTOR IF YOU HAVE ANY OF THE FOLLOWING:
Bleeding that does not stop/Pain not relieved by Medications/Increased irritability or sluggishness Bleeding that does not stop/Pain not relieved by Medications/Unable to urinate/Increased irritability or sluggishness

## 2025-01-14 NOTE — ASU DISCHARGE PLAN (ADULT/PEDIATRIC) - FINANCIAL ASSISTANCE
[facility] provides services at a reduced cost to those who are determined to be eligible through [facility]’s financial assistance program. Information regarding [facility]’s financial assistance program can be found by going to [email or web address] or by calling [phone].

## 2025-01-14 NOTE — ASU DISCHARGE PLAN (ADULT/PEDIATRIC) - CARE PROVIDER_API CALL
Penelope Short  Colon/Rectal Surgery  [address], [address]  [place] [address]  Phone: [phone]  Fax: [phone]  Follow Up Time:

## 2025-01-16 ENCOUNTER — OUTPATIENT (OUTPATIENT)
Dept: OUTPATIENT SERVICES | Facility: HOSPITAL | Age: 50
LOS: 1 days | End: 2025-01-16
Payer: COMMERCIAL

## 2025-01-16 VITALS
HEART RATE: 63 BPM | RESPIRATION RATE: 11 BRPM | SYSTOLIC BLOOD PRESSURE: 134 MMHG | DIASTOLIC BLOOD PRESSURE: 83 MMHG | TEMPERATURE: 97 F | WEIGHT: 211.2 LBS | OXYGEN SATURATION: 96 % | HEIGHT: 70 IN

## 2025-01-16 VITALS
RESPIRATION RATE: 16 BRPM | HEART RATE: 64 BPM | DIASTOLIC BLOOD PRESSURE: 65 MMHG | SYSTOLIC BLOOD PRESSURE: 108 MMHG | TEMPERATURE: 97 F | OXYGEN SATURATION: 97 %

## 2025-01-16 DIAGNOSIS — Z87.898 PERSONAL HISTORY OF OTHER SPECIFIED CONDITIONS: Chronic | ICD-10-CM

## 2025-01-16 DIAGNOSIS — K64.3 FOURTH DEGREE HEMORRHOIDS: ICD-10-CM

## 2025-01-16 DIAGNOSIS — Z98.890 OTHER SPECIFIED POSTPROCEDURAL STATES: Chronic | ICD-10-CM

## 2025-01-16 PROCEDURE — 88304 TISSUE EXAM BY PATHOLOGIST: CPT

## 2025-01-16 PROCEDURE — 88304 TISSUE EXAM BY PATHOLOGIST: CPT | Mod: 26

## 2025-01-16 PROCEDURE — 46255 REMOVE INT/EXT HEM 1 GROUP: CPT

## 2025-01-16 DEVICE — SURGICEL 2 X 14": Type: IMPLANTABLE DEVICE | Status: FUNCTIONAL

## 2025-01-16 RX ORDER — OXYCODONE HCL 15 MG
1 TABLET ORAL
Qty: 20 | Refills: 0
Start: 2025-01-16

## 2025-01-16 RX ORDER — FENTANYL 75 UG/H
50 PATCH, EXTENDED RELEASE TRANSDERMAL
Refills: 0 | Status: DISCONTINUED | OUTPATIENT
Start: 2025-01-16 | End: 2025-01-17

## 2025-01-16 RX ORDER — METRONIDAZOLE 250 MG/1
1 TABLET ORAL
Qty: 21 | Refills: 0
Start: 2025-01-16 | End: 2025-01-22

## 2025-01-16 RX ORDER — OXYCODONE HCL 15 MG
5 TABLET ORAL ONCE
Refills: 0 | Status: DISCONTINUED | OUTPATIENT
Start: 2025-01-16 | End: 2025-01-17

## 2025-01-16 RX ORDER — FERROUS FUMARATE 350(115)MG
0 TABLET ORAL
Refills: 0 | DISCHARGE

## 2025-01-16 RX ORDER — KETOROLAC TROMETHAMINE 30 MG/ML
1 INJECTION INTRAMUSCULAR; INTRAVENOUS
Qty: 20 | Refills: 0
Start: 2025-01-16 | End: 2025-01-20

## 2025-01-16 RX ORDER — FENTANYL 75 UG/H
25 PATCH, EXTENDED RELEASE TRANSDERMAL
Refills: 0 | Status: DISCONTINUED | OUTPATIENT
Start: 2025-01-16 | End: 2025-01-17

## 2025-01-16 RX ORDER — ESCITALOPRAM OXALATE 10 MG/1
1 TABLET ORAL
Refills: 0 | DISCHARGE

## 2025-01-16 RX ORDER — ESCITALOPRAM OXALATE 10 MG/1
2 TABLET ORAL
Refills: 0 | DISCHARGE

## 2025-01-16 RX ORDER — HYDROMORPHONE HCL 4 MG
0.5 TABLET ORAL
Refills: 0 | Status: DISCONTINUED | OUTPATIENT
Start: 2025-01-16 | End: 2025-01-17

## 2025-01-16 RX ORDER — SODIUM CHLORIDE 9 MG/ML
1000 INJECTION, SOLUTION INTRAVENOUS
Refills: 0 | Status: DISCONTINUED | OUTPATIENT
Start: 2025-01-16 | End: 2025-01-17

## 2025-01-16 RX ORDER — CIPROFLOXACIN HYDROCHLORIDE 500 MG/1
1 TABLET, FILM COATED ORAL
Qty: 14 | Refills: 0
Start: 2025-01-16 | End: 2025-01-22

## 2025-01-16 RX ADMIN — SODIUM CHLORIDE 75 MILLILITER(S): 9 INJECTION, SOLUTION INTRAVENOUS at 14:59

## 2025-06-11 ENCOUNTER — APPOINTMENT (OUTPATIENT)
Dept: PSYCHIATRY | Facility: CLINIC | Age: 50
End: 2025-06-11
Payer: COMMERCIAL

## 2025-06-11 PROCEDURE — 99214 OFFICE O/P EST MOD 30 MIN: CPT

## (undated) DEVICE — CANISTER DISPOSABLE THIN WALL 3000CC

## (undated) DEVICE — LUBRICATING JELLY ONESHOT 1.25OZ

## (undated) DEVICE — POSITIONER FOAM EGG CRATE ULNAR 2PCS (PINK)

## (undated) DEVICE — ELCTR STRYKER NEPTUNE SMOKE EVACUATION PENCIL (GREEN)

## (undated) DEVICE — POSITIONER BLUE BOLSTER

## (undated) DEVICE — BASIN SET DOUBLE

## (undated) DEVICE — WARMING BLANKET UPPER ADULT

## (undated) DEVICE — DRSG CURITY GAUZE SPONGE 4 X 4" 12-PLY

## (undated) DEVICE — POSITIONER STRAP ARMBOARD VELCRO TS-30

## (undated) DEVICE — LABELS BLANK W PEN

## (undated) DEVICE — GOWN XL

## (undated) DEVICE — DRSG TELFA 3 X 4

## (undated) DEVICE — PACK MINOR WITH LAP

## (undated) DEVICE — DRAPE LAPAROTOMY TRANSVERSE

## (undated) DEVICE — DRSG TAPE MEDIPORE 3"

## (undated) DEVICE — SOL IRR POUR H2O 500ML

## (undated) DEVICE — ELCTR BOVIE TIP BLADE INSULATED 2.75" EDGE

## (undated) DEVICE — VENODYNE/SCD SLEEVE CALF MEDIUM

## (undated) DEVICE — SOL IRR POUR NS 0.9% 500ML

## (undated) DEVICE — SYR LUER LOK 10CC

## (undated) DEVICE — NDL OBTURA 25G

## (undated) DEVICE — PREP BETADINE KIT

## (undated) DEVICE — SUT VICRYL 3-0 18" SH (POP-OFF)